# Patient Record
Sex: FEMALE | Race: ASIAN | NOT HISPANIC OR LATINO
[De-identification: names, ages, dates, MRNs, and addresses within clinical notes are randomized per-mention and may not be internally consistent; named-entity substitution may affect disease eponyms.]

---

## 2021-12-07 ENCOUNTER — APPOINTMENT (OUTPATIENT)
Dept: CARDIOLOGY | Facility: CLINIC | Age: 56
End: 2021-12-07
Payer: MEDICAID

## 2021-12-07 VITALS
BODY MASS INDEX: 20.89 KG/M2 | SYSTOLIC BLOOD PRESSURE: 98 MMHG | WEIGHT: 115 LBS | HEIGHT: 62.2 IN | TEMPERATURE: 97.6 F | RESPIRATION RATE: 16 BRPM | DIASTOLIC BLOOD PRESSURE: 68 MMHG | OXYGEN SATURATION: 97 % | HEART RATE: 86 BPM

## 2021-12-07 DIAGNOSIS — R07.89 OTHER CHEST PAIN: ICD-10-CM

## 2021-12-07 PROBLEM — Z00.00 ENCOUNTER FOR PREVENTIVE HEALTH EXAMINATION: Status: ACTIVE | Noted: 2021-12-07

## 2021-12-07 PROCEDURE — 99204 OFFICE O/P NEW MOD 45 MIN: CPT

## 2022-02-11 NOTE — REASON FOR VISIT
[FreeTextEntry1] : 56 year-old female with HLD presents for evaluation of CP. Patient reports that for years she has substernal CP with pressure lasting hours, not related to exertion. Patient denies SOB. Patient denies belching. She reports occasional palpitations lasting seconds. Patient denies h/o syncope. I advised patient to undergo an echocardiogram and a treadmill stress test. Patient wish to return for testing.

## 2024-04-09 ENCOUNTER — APPOINTMENT (OUTPATIENT)
Dept: CARDIOLOGY | Facility: CLINIC | Age: 59
End: 2024-04-09
Payer: COMMERCIAL

## 2024-04-09 VITALS
RESPIRATION RATE: 18 BRPM | BODY MASS INDEX: 20.53 KG/M2 | SYSTOLIC BLOOD PRESSURE: 122 MMHG | WEIGHT: 113 LBS | OXYGEN SATURATION: 97 % | DIASTOLIC BLOOD PRESSURE: 79 MMHG | HEART RATE: 74 BPM

## 2024-04-09 DIAGNOSIS — R06.00 DYSPNEA, UNSPECIFIED: ICD-10-CM

## 2024-04-09 DIAGNOSIS — R42 DIZZINESS AND GIDDINESS: ICD-10-CM

## 2024-04-09 PROCEDURE — 93306 TTE W/DOPPLER COMPLETE: CPT

## 2024-04-09 PROCEDURE — 99214 OFFICE O/P EST MOD 30 MIN: CPT

## 2024-04-09 NOTE — HISTORY OF PRESENT ILLNESS
[FreeTextEntry1] : Pt reports doing well for past few years. She reports intermittent, rare episodes of dizziness when lifting her head up or changing positions. It feels like room-spinning sensation. She was referred to see neurology but didn't see yet. She also reports feelings of SOB that occurs randomly, not related to exertion. No CP, palpitations, LOC, orthopnea, PND, or LE edema.

## 2024-04-09 NOTE — REASON FOR VISIT
[Symptom and Test Evaluation] : symptom and test evaluation [FreeTextEntry1] : 59 year old F with HLD who presents for evaluation of dizziness.  Last seen by Dr. Domingo 12/7/21 - presents for evaluation of CP. Patient reports that for years she has substernal CP with pressure lasting hours, not related to exertion. Patient denies SOB. Patient denies belching. She reports occasional palpitations lasting seconds. Patient denies h/o syncope. I advised patient to undergo an echocardiogram and a treadmill stress test. Patient wish to return for testing.

## 2024-04-09 NOTE — ASSESSMENT
[FreeTextEntry1] : 59 year old F with HLD who presents for evaluation of dizziness.  Pt reports doing well for past few years. She reports intermittent, rare episodes of dizziness when lifting her head up or changing positions. It feels like room-spinning sensation. She was referred to see neurology but didn't see yet. She also reports feelings of SOB that occurs randomly, not related to exertion. No CP, palpitations, LOC, orthopnea, PND, or LE edema.  1) Dizziness 2) Dyspnea - EKG 4/9/24 showed sinus rhythm without significant abnormalities - Given SOB and dizziness, advised pt to undergo TTE to r/o structural heart disease and valvular pathology. On 4/9/24, it showed normal LV and RV function without significant valvular disease. - Pt was reassured; pt will consider seeing neurology if dizziness becomes more frequent  3) Follow-up, as needed